# Patient Record
Sex: FEMALE | Race: WHITE | NOT HISPANIC OR LATINO | URBAN - METROPOLITAN AREA
[De-identification: names, ages, dates, MRNs, and addresses within clinical notes are randomized per-mention and may not be internally consistent; named-entity substitution may affect disease eponyms.]

---

## 2021-08-20 ENCOUNTER — EMERGENCY (EMERGENCY)
Facility: HOSPITAL | Age: 46
LOS: 1 days | Discharge: HOME | End: 2021-08-20
Attending: EMERGENCY MEDICINE
Payer: COMMERCIAL

## 2021-08-20 VITALS
WEIGHT: 160.06 LBS | RESPIRATION RATE: 18 BRPM | HEIGHT: 64 IN | SYSTOLIC BLOOD PRESSURE: 122 MMHG | TEMPERATURE: 98 F | DIASTOLIC BLOOD PRESSURE: 82 MMHG | OXYGEN SATURATION: 98 % | HEART RATE: 79 BPM

## 2021-08-20 PROCEDURE — 73060 X-RAY EXAM OF HUMERUS: CPT

## 2021-08-20 PROCEDURE — 90471 IMMUNIZATION ADMIN: CPT

## 2021-08-20 PROCEDURE — 73080 X-RAY EXAM OF ELBOW: CPT

## 2021-08-20 PROCEDURE — 99284 EMERGENCY DEPT VISIT MOD MDM: CPT | Mod: 25

## 2021-08-20 PROCEDURE — 90715 TDAP VACCINE 7 YRS/> IM: CPT

## 2021-08-20 PROCEDURE — 73060 X-RAY EXAM OF HUMERUS: CPT | Mod: 26,RT

## 2021-08-20 PROCEDURE — 12045 INTMD RPR N-HF/GENIT12.6-20: CPT

## 2021-08-20 PROCEDURE — 73080 X-RAY EXAM OF ELBOW: CPT | Mod: 26,RT

## 2021-08-20 RX ORDER — TETANUS TOXOID, REDUCED DIPHTHERIA TOXOID AND ACELLULAR PERTUSSIS VACCINE, ADSORBED 5; 2.5; 8; 8; 2.5 [IU]/.5ML; [IU]/.5ML; UG/.5ML; UG/.5ML; UG/.5ML
0.5 SUSPENSION INTRAMUSCULAR ONCE
Refills: 0 | Status: COMPLETED | OUTPATIENT
Start: 2021-08-20 | End: 2021-08-20

## 2021-08-20 RX ADMIN — TETANUS TOXOID, REDUCED DIPHTHERIA TOXOID AND ACELLULAR PERTUSSIS VACCINE, ADSORBED 0.5 MILLILITER(S): 5; 2.5; 8; 8; 2.5 SUSPENSION INTRAMUSCULAR at 17:22

## 2021-08-20 RX ADMIN — Medication 1 TABLET(S): at 17:24

## 2021-08-20 NOTE — ED PROVIDER NOTE - PHYSICAL EXAMINATION
Positive large flap laceration to anterior distal humerus to elbow approx 5-6 inches  no tendon involvement, no apparent joint involvement   FROM to elbow, wrist and hand, sensation intact to distal extremity   pulse intact, cap refill intact

## 2021-08-20 NOTE — ED PROVIDER NOTE - NSFOLLOWUPINSTRUCTIONS_ED_ALL_ED_FT
Laceration    A laceration is a cut that goes through all of the layers of the skin and into the tissue that is right under the skin. Some lacerations heal on their own. Others need to be closed with skin adhesive strips, skin glue, stitches (sutures), or staples. Proper laceration care minimizes the risk of infection and helps the laceration to heal better.  If non-absorbable stitches or staples have been placed, they must be taken out within the time frame instructed by your healthcare provider.    SEEK IMMEDIATE MEDICAL CARE IF YOU HAVE ANY OF THE FOLLOWING SYMPTOMS: swelling around the wound, worsening pain, drainage from the wound, red streaking going away from your wound, inability to move finger or toe near the laceration, or discoloration of skin near the laceration.    Keep wound clean and dry  finish augmentin as directed   Apply new dressing twice daily with bacitracin   Follow up PMD in 48 hours  Have sutures removed in 10-14 days  Return to ER if any redness, warmth, discharge develops

## 2021-08-20 NOTE — ED PROVIDER NOTE - ATTENDING CONTRIBUTION TO CARE
Laceration to right upper arm s/p dog bite, her dog, distal biceps are, in sub-q tissue,  not violating fascia.  pe  distal biceps, u flap skin and soft tissue,  visible fascia, distal nv intact  xray no fb  plan washout out, abx, and wound closure

## 2021-08-20 NOTE — ED PROVIDER NOTE - OBJECTIVE STATEMENT
Patient is a 45 year old female who presents c/o laceration to right upper arm s/p dog bite.  patient states was her dog who is vaccinated. patient states dog bit her arm and she pulled away causing large flap laceration

## 2021-08-20 NOTE — ED PROVIDER NOTE - CLINICAL SUMMARY MEDICAL DECISION MAKING FREE TEXT BOX
45 year old female with right upper arm laceration s/p dog bite   will need lac repair, xray, adacel, augmentin

## 2021-08-20 NOTE — ED PROVIDER NOTE - PATIENT PORTAL LINK FT
You can access the FollowMyHealth Patient Portal offered by [facility] by registering at the following website: [email or web address]. By joining [employer]’s FollowMyHealth portal, you will also be able to view your health information using other applications (apps) compatible with our system.